# Patient Record
Sex: MALE | Race: WHITE
[De-identification: names, ages, dates, MRNs, and addresses within clinical notes are randomized per-mention and may not be internally consistent; named-entity substitution may affect disease eponyms.]

---

## 2018-09-18 ENCOUNTER — HOSPITAL ENCOUNTER (OUTPATIENT)
Dept: HOSPITAL 46 - ED | Age: 63
Setting detail: OBSERVATION
LOS: 1 days | Discharge: HOME | End: 2018-09-19
Attending: INTERNAL MEDICINE | Admitting: INTERNAL MEDICINE
Payer: MEDICARE

## 2018-09-18 VITALS — WEIGHT: 309.99 LBS | BODY MASS INDEX: 46.98 KG/M2 | HEIGHT: 68 IN

## 2018-09-18 DIAGNOSIS — Z91.14: ICD-10-CM

## 2018-09-18 DIAGNOSIS — Z79.899: ICD-10-CM

## 2018-09-18 DIAGNOSIS — Z79.84: ICD-10-CM

## 2018-09-18 DIAGNOSIS — I11.0: ICD-10-CM

## 2018-09-18 DIAGNOSIS — G25.81: ICD-10-CM

## 2018-09-18 DIAGNOSIS — Z79.51: ICD-10-CM

## 2018-09-18 DIAGNOSIS — I50.9: ICD-10-CM

## 2018-09-18 DIAGNOSIS — J96.11: ICD-10-CM

## 2018-09-18 DIAGNOSIS — J44.9: ICD-10-CM

## 2018-09-18 DIAGNOSIS — Z79.01: ICD-10-CM

## 2018-09-18 DIAGNOSIS — Z99.81: ICD-10-CM

## 2018-09-18 DIAGNOSIS — I48.91: Primary | ICD-10-CM

## 2018-09-18 DIAGNOSIS — N17.9: ICD-10-CM

## 2018-09-18 DIAGNOSIS — Z23: ICD-10-CM

## 2018-09-18 DIAGNOSIS — Z79.891: ICD-10-CM

## 2018-09-18 DIAGNOSIS — F17.200: ICD-10-CM

## 2018-09-18 DIAGNOSIS — E83.42: ICD-10-CM

## 2018-09-18 DIAGNOSIS — E11.9: ICD-10-CM

## 2018-09-18 PROCEDURE — G0008 ADMIN INFLUENZA VIRUS VAC: HCPCS

## 2018-09-18 PROCEDURE — G0378 HOSPITAL OBSERVATION PER HR: HCPCS

## 2018-09-18 NOTE — NUR
PT ARRIVED FROM ED PER STRETCHER 1950. ALERT ORIENTED X3. WAS UNSURE OF MEDS
WHEN ASKED AND DIDN'T ALWAYS KNOW WHAT THEY WERE FOR. STATES WAS TO BE ON A
FEW HR TRIP WITH FRIENDS BUT HAS BEEN AT [facility] FOR 2 DAYS WITHOUT
MEDS OR FEW MEALS. ON ARRIVAL TO CCU NOTED TO HAVE WHEEZES AND RT HERE, NEB TX
GIVEN. GIVEN SANDWICH BOX WHICH % OF. HAS OBLONG SCABBED AREA ON TOP OF
L SHOULDER WHICH IS RESIDUAL OF RECENT SHINGLES. DENIES PAIN. ON CARDIZEM GTT
AT 10MG/HR. GIVEN PO MEDS. ASKING ABOUT SOMETHING TO HELP WITH SLEEP AS HE
USUALLY TAKES SMALL AMT [place] FOR AT NIGHT.

## 2018-09-18 NOTE — NUR
CONTACTED BY PT'S FAMILY/FRIENDS. THEY TOLD STAFF THAT THEY HAVE BEEN LOOKING
FOR HIM AND HAD GONE HOME TO [place] TO RETRIEVE HIS MEDICATIONS. PT DID SPEAK
WITH YUSUF ON PHONE. STORIES VARIE FROM DIFFERENT PARTIES AND PT IS POOR
HISTORIAN. PT NOW SITTING AT EDGE OF BED, WANTING TO ORDER BREAKFAST, REMINDED
THAT KITCHEN DID NOT OPEN UNTIL 0700(HAD BEEN TOLD THIS) AND THAT IT WAS ONLY
1145. GIVEN PUDDING AS SNACK. NOW BACK IN BED. CARDIZEM 2.5MG/HR.

## 2018-09-19 NOTE — NUR
PT SHIFT REPORT RECEIVED FROM NIGHT SHIFT RN. PT IS SITTING ON BED RIGHT NOW.
PT DENIES ANY NEEDS AT THIS TIME. WILL CONTINUE TO CLOSELY MONITOR.

## 2018-09-19 NOTE — NUR
PT IS READY FOR DISCHARGE. AWAITING RIDE TO ARRIVE. PT HAD LUNCH AND TOLERATED
WELL. PT UP TO BATHROOM AND BACK TO CHAIR. WILL CONTINUE TO CLOSELY MONITOR.

## 2018-09-19 NOTE — NUR
PT AWAKE, SITTING AT BEDSIDE TO VOID. HR TRENDING UP GITA WITH MOVEMENT.
CARDIZEM 60MG PO GIVEN EARLY.

## 2018-09-19 NOTE — NUR
AMB TO [place] TO HAVE BM AND PASS GAS. FLUSHED TOILET BEFORE BM COULD BE OBSERVED.
DEBORA BEING UP FAIR AS HR DID INC BRIEFLY  BACK TO 80'S QUICKLY. NO SOB,
DENIES DIZZYNESS. GIVEN PO LOPRESSOR.

## 2018-09-19 NOTE — NUR
REVIEWED PT DISCHARGE INSTRUCTIONS WITH PATIENT AND FRIENDS. PT REPEATED
INFORMATION BACK TO THIS RN. PT IV'S DC'D AND BELONGINGS GATHERED. PACKET OF
INFORMATION SENT WITH PT. PT BROUGHT TO CAR IN WHEELCHAIR WITH OTHER RN. PT
WILL CALL TODAY FOR A FOLLOW-UP WITH HIS PCP. VITALS STABLE. MEDICATIONS
ADMINISTERED PRIOR TO DC PER MD. PT AND FRIENDS DENY FURTHER QUESTIONS AT THIS
TIME.

## 2018-09-19 NOTE — NUR
PT AWAKE TO VOID. CARDIZEM GTT DC'D. THE LADIES THAT THE PATIENT WAS TRAVELING
WITH ARE HERE TO SEE HIM NOW.

## 2018-09-19 NOTE — NUR
PT DENIES ANY NEEDS AT THIS TIME. PT IS UP TO THE EDGE OF THE BED WATCHING
OUTSIDE. FRIENDS ARE IN AND OUT OF ROOM. WILL CONTINUE TO CLOSELY MONITOR.

## 2018-09-19 NOTE — NUR
PT EATING LUNCH AT THIS TIME. HAVE DISCHARGE PAPERWORK READY. WAITING FOR
PATIENT RIDE TO ARRIVE. PER MD GIVE PT HIS METOPROLOL AND CARDIZEM THAT WILL
BE DUE. PT TO RESUME HOME MEDICATIONS AS NORMALLY SCHEDULED THIS EVENING. PT
DENIES NEEDING AN OXYGEN TANK FOR THE RIDE, STATES HE HAS ONE. PT STATES HE
WILL FOLLOW-UP WITH HIS MD ON FRIDAY. NO OTHER ISSUES AT THIS TIME.

## 2018-09-19 NOTE — NUR
PT SHIFT ASSESSMENT COMPLETED. PT HAS 2 FRIENDS IN THE ROOM AT THIS TIME. PT
LUNGS ARE CLEAR AND COARSE IN THE LOWER FIELDS. BOWEL TONES ACTIVE. PT DENIES
ANY PAIN AT THIS TIME. PT STATES "I REALLY WANT TO GO BACK HOME TODAY".
UPDATED PT THAT I WILL PASS THIS ON TO THE MD THIS AM WHEN HE COMES TO ASSESS
THE PATIENT. PT STATES "I FEEL LIKE MY NORMAL SELF". WILL CONTINUE TO CLOSELY
MONITOR.

## 2021-03-09 NOTE — EKG
[facility]
                                    [address]
                                  [place], [place]  [address]
_________________________________________________________________________________________
                                                                 Signed   
 
 
Atrial fibrillation with rvr
 
Nonspecific ST and T wave abnormality
Abnormal ECG
No previous ECGs available
Confirmed by PRADEEP SWEET DO ([id number]) on 9/18/2018 7:10:20 PM
 
 
 
 
 
 
 
 
 
 
 
 
 
 
 
 
 
 
 
 
 
 
 
 
 
 
 
 
 
 
 
 
 
 
 
 
 
 
 
    Electronically Signed By: PRADEEP SWEET DO  09/18/18 1910
_________________________________________________________________________________________
PATIENT NAME:     PAVITHRA SCHAEFER                   
MEDICAL RECORD #: [id number]                     Electrocardiogram             
          ACCT #: [id number]  
DATE OF BIRTH:   [birth year]                                       
PHYSICIAN:   PRADEEP SWEET DO                     REPORT #: [id number]
REPORT IS CONFIDENTIAL AND NOT TO BE RELEASED WITHOUT AUTHORIZATION
no